# Patient Record
Sex: MALE | Race: WHITE | NOT HISPANIC OR LATINO | Employment: FULL TIME | URBAN - NONMETROPOLITAN AREA
[De-identification: names, ages, dates, MRNs, and addresses within clinical notes are randomized per-mention and may not be internally consistent; named-entity substitution may affect disease eponyms.]

---

## 2020-05-21 ENCOUNTER — OFFICE VISIT (OUTPATIENT)
Dept: CARDIOLOGY | Facility: CLINIC | Age: 20
End: 2020-05-21

## 2020-05-21 VITALS
HEART RATE: 81 BPM | TEMPERATURE: 98.3 F | WEIGHT: 190 LBS | RESPIRATION RATE: 16 BRPM | HEIGHT: 72 IN | DIASTOLIC BLOOD PRESSURE: 77 MMHG | SYSTOLIC BLOOD PRESSURE: 119 MMHG | BODY MASS INDEX: 25.73 KG/M2

## 2020-05-21 DIAGNOSIS — Z87.74 H/O REPAIR OF PATENT DUCTUS ARTERIOSUS: ICD-10-CM

## 2020-05-21 DIAGNOSIS — R07.9 CHEST PAIN, UNSPECIFIED TYPE: Primary | ICD-10-CM

## 2020-05-21 PROCEDURE — 99203 OFFICE O/P NEW LOW 30 MIN: CPT | Performed by: INTERNAL MEDICINE

## 2020-05-21 PROCEDURE — 93000 ELECTROCARDIOGRAM COMPLETE: CPT | Performed by: INTERNAL MEDICINE

## 2020-05-21 RX ORDER — CLINDAMYCIN HYDROCHLORIDE 300 MG/1
300 CAPSULE ORAL 3 TIMES DAILY
COMMUNITY

## 2020-05-21 RX ORDER — CLINDAMYCIN PHOSPHATE 10 UG/ML
LOTION TOPICAL
COMMUNITY

## 2020-05-21 NOTE — PROGRESS NOTES
Jonathan Delgado  2020    Subjective     Jonathan Delgado is a 19 y.o. male with the problems as listed above, presents    Chief complaint: Recurrent chest pains.    History of Present Illness: Mr. Delgado is a young 19-year-old  male with no history of known coronary artery disease but has history of apparently patent ductus arteriosus for which he has had repair at age 1. He presents with complaints of having recurrent episodes of chest pains that he describes as sharp pains on the left side of his chest with some associated shortness of breath but no sweating.  These been occurring at random with no relation to exertion and resolve spontaneously.  They are of mild to moderate intensity.  He also has some intermittent palpitations with rapid heartbeat but no associated dizziness or syncope.    Allergies   Allergen Reactions   • Rocephin [Ceftriaxone] Other (See Comments)     Unknown reaction   • Sulfa Antibiotics Other (See Comments)     Reaction unknown   • Amoxicillin Rash   :      Current Outpatient Medications:   •  clindamycin (CLEOCIN T) 1 % lotion, , Disp: , Rfl:   •  clindamycin (CLEOCIN) 300 MG capsule, Take 300 mg by mouth 3 (Three) Times a Day., Disp: , Rfl:     Past Medical History:   Diagnosis Date   • Atrial septal defect      Past Surgical History:   Procedure Laterality Date   • ATRIAL SEPTAL DEFECT REPAIR     • FINGER SURGERY     • TONSILLECTOMY       Family History   Problem Relation Age of Onset   • Heart disease Paternal Grandfather      Social History     Tobacco Use   • Smoking status: Former Smoker     Packs/day: 0.25     Types: Cigarettes     Last attempt to quit: 2018     Years since quittin.3   • Smokeless tobacco: Never Used   Substance Use Topics   • Alcohol use: Yes     Comment: social   • Drug use: Never       Review of Systems   Constitution: Negative for chills, diaphoresis and fever.   HENT: Positive for nosebleeds.    Eyes: Positive for  "visual disturbance.   Cardiovascular: Positive for chest pain and palpitations. Negative for leg swelling, orthopnea and paroxysmal nocturnal dyspnea.        Hx ASD repair age 1, [facility]   Respiratory: Negative for cough, hemoptysis and shortness of breath.    Endocrine: Negative for cold intolerance and heat intolerance.   Hematologic/Lymphatic: Does not bruise/bleed easily.   Skin: Positive for color change, itching and rash.   Musculoskeletal: Negative for myalgias.   Gastrointestinal: Positive for abdominal pain and heartburn. Negative for constipation, diarrhea, nausea and vomiting.   Genitourinary: Negative for dysuria and hematuria.   Neurological: Positive for numbness and paresthesias. Negative for dizziness and focal weakness.   All other systems reviewed and are negative.      Objective   Blood pressure 119/77, pulse 81, temperature 98.3 °F (36.8 °C), resp. rate 16, height 182.9 cm (72\"), weight 86.2 kg (190 lb).  Body mass index is 25.77 kg/m².        Physical Exam   Constitutional: He is oriented to person, place, and time. He appears well-developed and well-nourished.   HENT:   Mouth/Throat: Oropharynx is clear and moist.   Eyes: Pupils are equal, round, and reactive to light. EOM are normal.   Neck: Neck supple. No JVD present. No tracheal deviation present. No thyromegaly present.   Cardiovascular: Normal rate, regular rhythm, S1 normal and S2 normal. Exam reveals no gallop and no friction rub.   No murmur heard.  Pulmonary/Chest: Effort normal and breath sounds normal.   Abdominal: Soft. Bowel sounds are normal. He exhibits no mass. There is no tenderness.   Musculoskeletal: Normal range of motion. He exhibits no edema.   Lymphadenopathy:     He has no cervical adenopathy.   Neurological: He is alert and oriented to person, place, and time.   Skin: Skin is warm and dry. No rash noted.   Has skin burn on both lower extremities due to exposure to sun.   Psychiatric: He has a normal mood and " affect.       ECG 12 Lead  Date/Time: 5/21/2020 1:23 PM  Performed by: Janes Renee MD  Authorized by: Janes Renee MD   Previous ECG: no previous ECG available  Rhythm: sinus rhythm  BPM: 71  Comments: Mild T wave inversion in leads V3 through V6 with questionable anterolateral myocardial ischemia.            Assessment/Plan :   Diagnosis Plan   1. Chest pain, atypical for angina. Adult Transthoracic Echo    2. H/O repair of patent ductus arteriosus  Adult Transthoracic Echo      Recommendations:  Orders Placed This Encounter   Procedures   • ECG 12 Lead   • Adult Transthoracic Echo Complete W/ Cont if Necessary Per Protocol      Will evaluate further with an echo Doppler study.    Return in about 3 weeks (around 6/11/2020).      With Best Regards,        Janes Renee MD, FACC    Please note that portions of this note were completed with a voice recognition program.

## 2020-05-29 ENCOUNTER — HOSPITAL ENCOUNTER (OUTPATIENT)
Dept: CARDIOLOGY | Facility: HOSPITAL | Age: 20
Discharge: HOME | End: 2020-05-29
Admitting: INTERNAL MEDICINE

## 2020-05-29 DIAGNOSIS — Z87.74 H/O REPAIR OF PATENT DUCTUS ARTERIOSUS: ICD-10-CM

## 2020-05-29 DIAGNOSIS — R07.9 CHEST PAIN, UNSPECIFIED TYPE: ICD-10-CM

## 2020-05-29 PROCEDURE — 93306 TTE W/DOPPLER COMPLETE: CPT | Performed by: INTERNAL MEDICINE

## 2020-05-29 PROCEDURE — 93306 TTE W/DOPPLER COMPLETE: CPT

## 2020-05-31 LAB
BH CV ECHO MEAS - ACS: 2.3 CM
BH CV ECHO MEAS - AO MAX PG: 13.5 MMHG
BH CV ECHO MEAS - AO MEAN PG: 6 MMHG
BH CV ECHO MEAS - AO ROOT AREA (BSA CORRECTED): 1.4
BH CV ECHO MEAS - AO ROOT AREA: 6.6 CM^2
BH CV ECHO MEAS - AO ROOT DIAM: 2.9 CM
BH CV ECHO MEAS - AO V2 MAX: 184 CM/SEC
BH CV ECHO MEAS - AO V2 MEAN: 110 CM/SEC
BH CV ECHO MEAS - AO V2 VTI: 38 CM
BH CV ECHO MEAS - BSA(HAYCOCK): 2.1 M^2
BH CV ECHO MEAS - BSA: 2.1 M^2
BH CV ECHO MEAS - BZI_BMI: 25.8 KILOGRAMS/M^2
BH CV ECHO MEAS - BZI_METRIC_HEIGHT: 182.9 CM
BH CV ECHO MEAS - BZI_METRIC_WEIGHT: 86.2 KG
BH CV ECHO MEAS - EDV(CUBED): 110.6 ML
BH CV ECHO MEAS - EDV(MOD-SP4): 66.6 ML
BH CV ECHO MEAS - EDV(TEICH): 107.5 ML
BH CV ECHO MEAS - EF(CUBED): 65.1 %
BH CV ECHO MEAS - EF(MOD-SP4): 57.4 %
BH CV ECHO MEAS - EF(TEICH): 56.5 %
BH CV ECHO MEAS - ESV(CUBED): 38.6 ML
BH CV ECHO MEAS - ESV(MOD-SP4): 28.4 ML
BH CV ECHO MEAS - ESV(TEICH): 46.8 ML
BH CV ECHO MEAS - FS: 29.6 %
BH CV ECHO MEAS - IVS/LVPW: 0.65
BH CV ECHO MEAS - IVSD: 0.93 CM
BH CV ECHO MEAS - LA DIMENSION: 3.9 CM
BH CV ECHO MEAS - LA/AO: 1.3
BH CV ECHO MEAS - LV DIASTOLIC VOL/BSA (35-75): 32 ML/M^2
BH CV ECHO MEAS - LV MASS(C)D: 213.7 GRAMS
BH CV ECHO MEAS - LV MASS(C)DI: 102.5 GRAMS/M^2
BH CV ECHO MEAS - LV SYSTOLIC VOL/BSA (12-30): 13.6 ML/M^2
BH CV ECHO MEAS - LVIDD: 4.8 CM
BH CV ECHO MEAS - LVIDS: 3.4 CM
BH CV ECHO MEAS - LVLD AP4: 8 CM
BH CV ECHO MEAS - LVLS AP4: 7 CM
BH CV ECHO MEAS - LVOT AREA (M): 4.2 CM^2
BH CV ECHO MEAS - LVOT AREA: 4.2 CM^2
BH CV ECHO MEAS - LVOT DIAM: 2.3 CM
BH CV ECHO MEAS - LVPWD: 1.4 CM
BH CV ECHO MEAS - MV A MAX VEL: 86.2 CM/SEC
BH CV ECHO MEAS - MV E MAX VEL: 154 CM/SEC
BH CV ECHO MEAS - MV E/A: 1.8
BH CV ECHO MEAS - PA ACC TIME: 0.09 SEC
BH CV ECHO MEAS - PA PR(ACCEL): 37.6 MMHG
BH CV ECHO MEAS - RAP SYSTOLE: 10 MMHG
BH CV ECHO MEAS - RVSP: 30 MMHG
BH CV ECHO MEAS - SI(AO): 120.4 ML/M^2
BH CV ECHO MEAS - SI(CUBED): 34.5 ML/M^2
BH CV ECHO MEAS - SI(MOD-SP4): 18.3 ML/M^2
BH CV ECHO MEAS - SI(TEICH): 29.1 ML/M^2
BH CV ECHO MEAS - SV(AO): 251 ML
BH CV ECHO MEAS - SV(CUBED): 72 ML
BH CV ECHO MEAS - SV(MOD-SP4): 38.2 ML
BH CV ECHO MEAS - SV(TEICH): 60.8 ML
BH CV ECHO MEAS - TR MAX VEL: 223.3 CM/SEC
MAXIMAL PREDICTED HEART RATE: 199 BPM
STRESS TARGET HR: 169 BPM

## 2020-06-23 ENCOUNTER — OFFICE VISIT (OUTPATIENT)
Dept: CARDIOLOGY | Facility: CLINIC | Age: 20
End: 2020-06-23

## 2020-06-23 VITALS
HEIGHT: 72 IN | DIASTOLIC BLOOD PRESSURE: 75 MMHG | BODY MASS INDEX: 26.01 KG/M2 | TEMPERATURE: 95.1 F | RESPIRATION RATE: 16 BRPM | HEART RATE: 71 BPM | SYSTOLIC BLOOD PRESSURE: 122 MMHG | WEIGHT: 192 LBS

## 2020-06-23 DIAGNOSIS — R07.2 PRECORDIAL PAIN: Primary | ICD-10-CM

## 2020-06-23 PROCEDURE — 99213 OFFICE O/P EST LOW 20 MIN: CPT | Performed by: PHYSICIAN ASSISTANT

## 2020-06-23 NOTE — PROGRESS NOTES
Provider, No Known  Jonathan Delgado  2020    Patient Active Problem List   Diagnosis   • Precordial pain       Dear Provider, No Known:    Subjective     History of Present Illness:    Chief Complaint   Patient presents with   • Results     echo findings   • Med Management     none taken       Jonathan Delgado is a pleasant 19 y.o. male with a past medical history significant for patent ductus arteriosus as a child that was corrected at the age of 1 and has no other known medical problems. He is here for results of echocardiogram.     Patient's echocardiogram did reveal a 56 to 60% left ventricular ejection fraction with mild mitral valve regurgitation but was otherwise unremarkable.  Patient reports that since last visit his symptoms have completely resolved he believes they are brought on from stress recently asked to change position in his workplace to less stressful place and since then has been doing much better.  He now denies any chest pains, shortness of breath, palpitations, dizziness, or syncope.    Allergies   Allergen Reactions   • Rocephin [Ceftriaxone] Other (See Comments)     Unknown reaction   • Sulfa Antibiotics Other (See Comments)     Reaction unknown   • Amoxicillin Rash   :      Current Outpatient Medications:   •  clindamycin (CLEOCIN T) 1 % lotion, , Disp: , Rfl:   •  clindamycin (CLEOCIN) 300 MG capsule, Take 300 mg by mouth 3 (Three) Times a Day., Disp: , Rfl:     The following portions of the patient's history were reviewed and updated as appropriate: allergies, current medications, past family history, past medical history, past social history, past surgical history and problem list.    Social History     Tobacco Use   • Smoking status: Former Smoker     Packs/day: 0.25     Types: Cigarettes     Last attempt to quit: 2018     Years since quittin.4   • Smokeless tobacco: Never Used   Substance Use Topics   • Alcohol use: Yes     Comment: social   • Drug use:  "Never       Review of Systems   Constitution: Negative for malaise/fatigue.   Cardiovascular: Negative for chest pain, dyspnea on exertion, irregular heartbeat, leg swelling and palpitations.   Respiratory: Negative for cough and shortness of breath.    Hematologic/Lymphatic: Negative for bleeding problem. Does not bruise/bleed easily.   Gastrointestinal: Negative for nausea and vomiting.   Neurological: Negative for weakness.       Objective   Vitals:    06/23/20 0843   BP: 122/75   Pulse: 71   Resp: 16   Temp: 95.1 °F (35.1 °C)   Weight: 87.1 kg (192 lb)   Height: 182.9 cm (72\")     Body mass index is 26.04 kg/m².    Physical Exam   Constitutional: He is oriented to person, place, and time. He appears well-developed and well-nourished. No distress.   HENT:   Head: Normocephalic and atraumatic.   Cardiovascular: Normal rate, regular rhythm and normal heart sounds.   Pulmonary/Chest: Effort normal and breath sounds normal. No respiratory distress.   Musculoskeletal: He exhibits no edema.   Neurological: He is alert and oriented to person, place, and time.   Skin: He is not diaphoretic.       During this visit the following were done:  Labs Reviewed [x]    Labs Ordered []    Radiology Reports Reviewed [x]    Radiology Ordered []    PCP Records Reviewed []    Referring Provider Records Reviewed []    ER Records Reviewed []    Hospital Records Reviewed []    History Obtained From Family []    Radiology Images Reviewed []    Other Reviewed []    Records Requested []       Procedures    Assessment/Plan    Diagnosis Plan   1. Precordial pain              Recommendations:  1. I discussed echocardiogram with the patinet. Otherwise patient is stable without symptoms normal medical problems.  I will make her PRN.    Return if symptoms worsen or fail to improve.    As always, I appreciate very much the opportunity to participate in the cardiovascular care of your patients.      With Best Regards,    Calos Jernigan, " FREDERIC